# Patient Record
Sex: MALE | URBAN - METROPOLITAN AREA
[De-identification: names, ages, dates, MRNs, and addresses within clinical notes are randomized per-mention and may not be internally consistent; named-entity substitution may affect disease eponyms.]

---

## 2023-04-11 ENCOUNTER — APPOINTMENT (RX ONLY)
Dept: URBAN - METROPOLITAN AREA CLINIC 374 | Facility: CLINIC | Age: 43
Setting detail: DERMATOLOGY
End: 2023-04-11

## 2023-04-11 DIAGNOSIS — L40.0 PSORIASIS VULGARIS: ICD-10-CM | Status: INADEQUATELY CONTROLLED

## 2023-04-11 DIAGNOSIS — Z79.899 OTHER LONG TERM (CURRENT) DRUG THERAPY: ICD-10-CM

## 2023-04-11 PROCEDURE — ? TREMFYA INJECTION

## 2023-04-11 PROCEDURE — ? DMARD INITIATION

## 2023-04-11 PROCEDURE — ? PHOTO-DOCUMENTATION

## 2023-04-11 PROCEDURE — ? PRESCRIPTION MEDICATION MANAGEMENT

## 2023-04-11 PROCEDURE — ? ORDER TESTS

## 2023-04-11 PROCEDURE — ? COUNSELING

## 2023-04-11 PROCEDURE — 99204 OFFICE O/P NEW MOD 45 MIN: CPT | Mod: 25

## 2023-04-11 PROCEDURE — ? HIGH RISK MEDICATION MONITORING

## 2023-04-11 PROCEDURE — 96372 THER/PROPH/DIAG INJ SC/IM: CPT

## 2023-04-11 ASSESSMENT — LOCATION SIMPLE DESCRIPTION DERM
LOCATION SIMPLE: LEFT THIGH
LOCATION SIMPLE: SCALP
LOCATION SIMPLE: LEFT ELBOW
LOCATION SIMPLE: UPPER BACK
LOCATION SIMPLE: RIGHT KNEE
LOCATION SIMPLE: LEFT KNEE
LOCATION SIMPLE: RIGHT ELBOW

## 2023-04-11 ASSESSMENT — ITCH NUMERIC RATING SCALE: HOW SEVERE IS YOUR ITCHING?: 8

## 2023-04-11 ASSESSMENT — BSA PSORIASIS: % BODY COVERED IN PSORIASIS: 30

## 2023-04-11 ASSESSMENT — PGA PSORIASIS: PGA PSORIASIS 2020: SEVERE

## 2023-04-11 ASSESSMENT — LOCATION ZONE DERM
LOCATION ZONE: LEG
LOCATION ZONE: SCALP
LOCATION ZONE: ARM
LOCATION ZONE: TRUNK

## 2023-04-11 ASSESSMENT — LOCATION DETAILED DESCRIPTION DERM
LOCATION DETAILED: LEFT SUPERIOR PARIETAL SCALP
LOCATION DETAILED: LEFT ELBOW
LOCATION DETAILED: RIGHT ELBOW
LOCATION DETAILED: LEFT KNEE
LOCATION DETAILED: RIGHT KNEE
LOCATION DETAILED: INFERIOR THORACIC SPINE
LOCATION DETAILED: LEFT ANTERIOR PROXIMAL THIGH

## 2023-04-11 NOTE — PROCEDURE: PRESCRIPTION MEDICATION MANAGEMENT
Detail Level: Zone
Render In Strict Bullet Format?: No
Plan: Patient is uninsured with severe plaque psoriasis covering 20% or more BSA. Will give samples of Tremfya to treat. Patient willing to pay to get baseline labs checked
Initiate Treatment: Tremfya: 100 mg SC week 0 and week 4 then every 8 weeks thereafter

## 2023-04-11 NOTE — PROCEDURE: DMARD INITIATION
Initial Cmp (Optional): pending
Include Lab And Follow-Up Information In The Note?: Yes
Ilumya Dosing: 100 mg SC week 0 and week 4 then every 12 weeks thereafter
Siliq Dosing: 210 mg SC at week 0,1 and 2 and then every 2 weeks thereafter
Methotrexate Monitoring Guidelines: The patient should be evaluated monthly for the first 3 months and then once every three months after that. Labs should be drawn 1 week after the first dose and then weekly for the first month. Labs should also be drawn 1-2 after raising the dose and before further dose escalations. Eventually labs should be drawn approximately once a quarter. Labs include CBC, LFTs, creatinine, and BUN.
Xeljanz Monitoring Guidelines: The patient should be seen regularly while taking Rinvoq. A CBC and Lipid panel should be checked 4 to 8 weeks after starting therapy and then every 3 months after that.
Xeljanz Dosing Override: 15 mg PO once daily
Dupixent Dosing: 600 mg SC day 0 then 300 mg SC every other week
Cimzia Dosing: 400 mg SC every other week
Is Soriatane Contraindicated?: No
Otezla Dosing: 10mg qam day 1, 10mg BID day 2, 10mg qam and 20mg day 3, 20mg BID day 4, 20mg qam and 30mg qpm day 5, and 30mg BID on day 6 and thereafter
Cimzia Monitoring Guidelines: A yearly test for tuberculosis is required while taking Cimzia.
Xolair Monitoring Guidelines: The patient should be monitored during dosing for anaphylaxis.
Mycophenolate Mofetil Monitoring Guidelines: The patient should be seen frequently at the start of therapy and then every 6 months when stable. A CBC, including platelet count, should be drawn weekly for the first 2-3 months. Starting in the fourth month the CBC should be drawn once a month for the first year. LFTs should be drawn after one month and then once a quarter.
Xolair Dosing: 300mg SC every 4 weeks
Simponi Monitoring Guidelines: A yearly test for tuberculosis is required while taking Simponi.
Cosentyx Monitoring Guidelines: A yearly test for tuberculosis is required while taking Cosentyx.
Taltz Dosing: 160mg SC x 1 at weeks 0 then 80mg SC at weeks 2, 4, 6, 8, 10 and 12 then 80mg SC every four weeks
Otezla Monitoring Guidelines: The patient should be monitored regular for depression and weight loss while taking Otezla. BUN and creatinine should be monitored on a regular basis.
Cyclosporine Monitoring Guidelines: The patient should be reevaluated in person every two weeks for the first 1-2 months and then monthly. Labs should follow a similar schedule and include: creatinine, BUN, CBC, LFTs, Lipids, magnesium, potassium, and uric acid. Blood pressure should be monitored at every visit as well.
Siliq Monitoring Guidelines: A yearly test for tuberculosis is required while taking Siliq.
Simponi Dosing: 50 mg SC every month
Imuran Dosing: 50mg twice daily
Plaquenil Monitoring Guidelines: The patient should be seen regularly while taking Plaquenil. The patient should have their visual field evaluated every year for signs of retinopathy. A CBC should be ordered monthly for the first 3 months and then every 4-6 months after that.
Plaquenil Dosing: 200mg daily
Enbrel Dosing: 50 mg SC twice weekly for 3 months then once a week there after
Cosentyx Dosing: 300 mg SC week 0, 1, 2, 3, and 4 then every 4 weeks after that
Detail Level: Zone
Dupixent Monitoring Guidelines: A yearly test for tuberculosis is required while taking Dupixent.\\nPatient will be continuously monitored for parasitic infections.
Remicade Monitoring Guidelines: A yearly test for tuberculosis is required while taking Remicade. A CBC should be ordered every 3-4 months on an ongoing basis while receiving infusions.
Diagnosis (Required): Psoriasis
Skyrizi Monitoring Guidelines: A yearly test for tuberculosis is required while taking Skyrizi.
Enbrel Monitoring Guidelines: A yearly test for tuberculosis is required while taking Enbrel.
Tremfya Dosing: 100 mg SC week 0 and week 4 then every 8 weeks thereafter
Dmard (Required): Tremfya
Methotrexate Dosing: 12.5mg taken once weekly
Skyrizi Dosing: 150 mg SC week 0 and week 4 then every 12 weeks thereafter
Remicade Dosing: 5mg/kg at week 0,2 and 6
Cyclosporine Dosing: 2 to 5 mg/kg/day divided BID. The medication should be taken with a glass of water after breakfast and dinner.
Humira Monitoring Guidelines: A yearly test for tuberculosis is required while taking Humira.
Humira Dosing: 80 mg SC day 0, 40 mg SC day 7, then 40 mg SC every other week
Mycophenolate Mofetil Dosing: 500mg twice daily
Stelara Monitoring Guidelines: A yearly test for tuberculosis is required while taking Stelara.
Ilumya Monitoring Guidelines: A yearly test for tuberculosis is required while taking Ilumya.
Pregnancy Warning Text: This medication is not considered safe during pregnancy and precaution should be taken to avoid conception.
Taltz Monitoring Guidelines: A yearly test for tuberculosis is required while taking Taltz.
Xeljanz Dosing: 5 mg taken orally twice daily
Imuran Monitoring Guidelines: The patient should be evaluated monthly for the first 3 months and then once every three months after that. Labs should be drawn twice weekly for the first 2 months and then every 3 months after that. Labs include: CBC and LFTs.
Tremfya Monitoring Guidelines: A yearly test for tuberculosis is required while taking Tremfya.
Stelara Dosing: 45mg SC at week 0 and 4 and then every 12 weeks thereafter
Lactation Warning Text: This medication is excreted in breast milk.

## 2023-04-11 NOTE — PROCEDURE: ORDER TESTS
Billing Type: Third-Party Bill
Performing Laboratory: [id number]
Expected Date Of Service: 04/11/2023
Bill For Surgical Tray: no

## 2023-04-11 NOTE — PROCEDURE: HIGH RISK MEDICATION MONITORING
Xelkathleenz Pregnancy And Lactation Text: This medication is Pregnancy Category D and is not considered safe during pregnancy.  The risk during breast feeding is also uncertain.